# Patient Record
Sex: FEMALE | Race: WHITE | NOT HISPANIC OR LATINO | URBAN - NONMETROPOLITAN AREA
[De-identification: names, ages, dates, MRNs, and addresses within clinical notes are randomized per-mention and may not be internally consistent; named-entity substitution may affect disease eponyms.]

---

## 2022-03-07 ENCOUNTER — LAB (OUTPATIENT)
Dept: LAB | Facility: HOSPITAL | Age: 62
End: 2022-03-07

## 2022-03-07 DIAGNOSIS — N39.0 URINARY TRACT INFECTION WITHOUT HEMATURIA, SITE UNSPECIFIED: ICD-10-CM

## 2022-03-07 DIAGNOSIS — N39.0 URINARY TRACT INFECTION WITHOUT HEMATURIA, SITE UNSPECIFIED: Primary | ICD-10-CM

## 2022-03-07 LAB
BACTERIA UR QL AUTO: ABNORMAL /HPF
BILIRUB UR QL STRIP: NEGATIVE
CLARITY UR: CLEAR
COLOR UR: YELLOW
GLUCOSE UR STRIP-MCNC: NEGATIVE MG/DL
HGB UR QL STRIP.AUTO: ABNORMAL
HYALINE CASTS UR QL AUTO: ABNORMAL /LPF
KETONES UR QL STRIP: NEGATIVE
LEUKOCYTE ESTERASE UR QL STRIP.AUTO: ABNORMAL
NITRITE UR QL STRIP: NEGATIVE
PH UR STRIP.AUTO: 6.5 [PH] (ref 5–8)
PROT UR QL STRIP: NEGATIVE
RBC # UR STRIP: ABNORMAL /HPF
REF LAB TEST METHOD: ABNORMAL
SP GR UR STRIP: 1.01 (ref 1–1.03)
SQUAMOUS #/AREA URNS HPF: ABNORMAL /HPF
UROBILINOGEN UR QL STRIP: ABNORMAL
WBC # UR STRIP: ABNORMAL /HPF

## 2022-03-07 PROCEDURE — 87086 URINE CULTURE/COLONY COUNT: CPT

## 2022-03-07 PROCEDURE — 81001 URINALYSIS AUTO W/SCOPE: CPT

## 2022-03-08 LAB — BACTERIA SPEC AEROBE CULT: NO GROWTH

## 2022-03-10 RX ORDER — NITROFURANTOIN 25; 75 MG/1; MG/1
100 CAPSULE ORAL 2 TIMES DAILY
Qty: 28 CAPSULE | Refills: 0 | Status: SHIPPED | OUTPATIENT
Start: 2022-03-10 | End: 2022-07-26

## 2022-03-10 RX ORDER — PHENAZOPYRIDINE HYDROCHLORIDE 100 MG/1
100 TABLET, FILM COATED ORAL 3 TIMES DAILY PRN
Qty: 300 TABLET | Refills: 0 | Status: SHIPPED | OUTPATIENT
Start: 2022-03-10 | End: 2022-07-26

## 2022-06-01 DIAGNOSIS — Z00.00 ENCOUNTER FOR MEDICAL EXAMINATION TO ESTABLISH CARE: Primary | ICD-10-CM

## 2022-07-26 ENCOUNTER — OFFICE VISIT (OUTPATIENT)
Dept: FAMILY MEDICINE CLINIC | Facility: CLINIC | Age: 62
End: 2022-07-26

## 2022-07-26 VITALS
DIASTOLIC BLOOD PRESSURE: 62 MMHG | OXYGEN SATURATION: 98 % | SYSTOLIC BLOOD PRESSURE: 118 MMHG | WEIGHT: 138.6 LBS | HEART RATE: 67 BPM | HEIGHT: 67 IN | BODY MASS INDEX: 21.75 KG/M2 | RESPIRATION RATE: 18 BRPM

## 2022-07-26 DIAGNOSIS — M54.50 CHRONIC LOW BACK PAIN, UNSPECIFIED BACK PAIN LATERALITY, UNSPECIFIED WHETHER SCIATICA PRESENT: ICD-10-CM

## 2022-07-26 DIAGNOSIS — G89.29 CHRONIC LOW BACK PAIN, UNSPECIFIED BACK PAIN LATERALITY, UNSPECIFIED WHETHER SCIATICA PRESENT: ICD-10-CM

## 2022-07-26 DIAGNOSIS — Z00.00 ANNUAL PHYSICAL EXAM: Primary | ICD-10-CM

## 2022-07-26 DIAGNOSIS — Z76.89 ENCOUNTER TO ESTABLISH CARE: ICD-10-CM

## 2022-07-26 DIAGNOSIS — M19.91 PRIMARY OSTEOARTHRITIS, UNSPECIFIED SITE: ICD-10-CM

## 2022-07-26 PROCEDURE — 99203 OFFICE O/P NEW LOW 30 MIN: CPT | Performed by: FAMILY MEDICINE

## 2022-07-26 RX ORDER — MELOXICAM 15 MG/1
15 TABLET ORAL DAILY
Qty: 90 TABLET | Refills: 3 | Status: SHIPPED | OUTPATIENT
Start: 2022-07-26

## 2022-07-26 RX ORDER — MELOXICAM 15 MG/1
15 TABLET ORAL DAILY
COMMUNITY
End: 2022-07-26 | Stop reason: SDUPTHER

## 2022-07-26 NOTE — PROGRESS NOTES
Chief Complaint  Establish Care    Subjective    History of Present Illness {CC  Problem List  Visit  Diagnosis   Encounters  Notes  Medications  Labs  Result Review Imaging  Media :23}     Elaine Alonzo presents to [facility] [place] for     Chief Complaint   Patient presents with   • Establish Care      Patient seen today to establish care.  Has chronic medical problems including chronic low back pain, intermittent bilateral shoulder pain and arthritis of the hands bilaterally.  Patient has meloxicam and muscle relaxers to use as needed, uses infrequently.  Occasionally requires steroid injection into the shoulder joints.  Can often relieve symptoms of back and shoulder pain with stretches via physical therapy.  She is generally very healthy.  Follows a healthy diet.  Used to swim for exercise, however has switched to riding bike due to shoulder discomfort.  Getting back into regular exercise routine.    Past Medical History:   Diagnosis Date   • Osteoarthritis      Past Surgical History:   Procedure Laterality Date   • CARPAL TUNNEL RELEASE Bilateral    • SHOULDER MANIPULATION Right      Family History   Problem Relation Age of Onset   • Hypertension Mother    • Parkinsonism Mother    • Cancer Sister    • Hypertension Sister    • Hypertension Brother    • Hypertension Maternal Grandmother      Social History     Socioeconomic History   • Marital status:    Tobacco Use   • Smoking status: Former Smoker     Packs/day: 2.00     Years: 7.00     Pack years: 14.00     Quit date:      Years since quittin.5   • Smokeless tobacco: Never Used   Vaping Use   • Vaping Use: Never used   Substance and Sexual Activity   • Alcohol use: Yes   • Drug use: Never   • Sexual activity: Defer       Current Outpatient Medications:   •  meloxicam (MOBIC) 15 MG tablet, Take 1 tablet by mouth Daily., Disp: 90 tablet, Rfl: 3     Allergies   Allergen Reactions   •  "Penicillins Unknown - High Severity     Objective       Vital Signs:   /62   Pulse 67   Resp 18   Ht 168.9 cm (66.5\")   Wt 62.9 kg (138 lb 9.6 oz)   SpO2 98%   BMI 22.04 kg/m²     Physical Exam  Vitals reviewed.   Constitutional:       General: She is not in acute distress.     Appearance: She is well-developed.   HENT:      Right Ear: Ear canal normal.      Left Ear: Tympanic membrane and ear canal normal.      Mouth/Throat:      Mouth: Mucous membranes are moist.      Pharynx: Oropharynx is clear.   Eyes:      Conjunctiva/sclera: Conjunctivae normal.      Pupils: Pupils are equal, round, and reactive to light.   Cardiovascular:      Rate and Rhythm: Normal rate and regular rhythm.      Heart sounds: Normal heart sounds. No murmur heard.  Pulmonary:      Effort: Pulmonary effort is normal. No respiratory distress.      Breath sounds: Normal breath sounds. No wheezing or rales.   Abdominal:      Palpations: Abdomen is soft.      Tenderness: There is no abdominal tenderness.   Musculoskeletal:      Cervical back: Neck supple.   Skin:     General: Skin is warm and dry.      Findings: No lesion (on back) or rash.   Neurological:      Mental Status: She is alert and oriented to person, place, and time.        Result Review :{ Labs  Result Review  Imaging  Med Tab  Media :23}   The following data was reviewed by: Nanci Stephen MD on 07/26/2022    No available results to review in EHR.  Reviewed previous office notes from [facility] pain management regarding back pain and related procedures, MRI lumbar spine and procedure note from [facility].             Assessment and Plan {CC Problem List  Visit Diagnosis  ROS  Review (Popup)  [facility] Maintenance  Quality  BestPractice  Medications  SmartSets  SnapShot Encounters  Media :23}   Diagnoses and all orders for this visit:    1. Annual physical exam (Primary)  -     Lipid Panel; Future  -     CBC & Differential; Future  -     " Comprehensive Metabolic Panel; Future  -     Vitamin D 25 Hydroxy; Future  -     Vitamin B12; Future  -     TSH; Future    2. Chronic low back pain, unspecified back pain laterality, unspecified whether sciatica present  -     meloxicam (MOBIC) 15 MG tablet; Take 1 tablet by mouth Daily.  Dispense: 90 tablet; Refill: 3    3. Primary osteoarthritis, unspecified site  -     meloxicam (MOBIC) 15 MG tablet; Take 1 tablet by mouth Daily.  Dispense: 90 tablet; Refill: 3    4. Encounter to establish care       Annual exam completed today  Patient will return fasting for labs as above  Continue Mobic as needed for chronic low back pain and aches/pains related to osteoarthritis  Patient aware of recommended screenings and immunizations, she declines preventative health measures at this time  Will continue to discuss at annual office visits, however patient will call in the interim should she decide differently  Patient has occasional UTI with very classic symptoms  Advised that patient can call or send [employer] message for antibiotic treatment as needed with UTI symptoms  If frequent or recurrent UTI that does not resolve with antibiotics, would recommend urine samples be collected instead of empiric treatment based on symptoms      Follow Up {Instructions Charge Capture  Follow-up Communications :23}   Return in about 1 year (around 7/26/2023) for Annual physical.  Patient was given instructions and counseling regarding her condition or for health maintenance advice. Please see specific information pulled into the AVS if appropriate.            This document has been electronically signed by Nanci Stephen MD

## 2022-07-28 ENCOUNTER — LAB (OUTPATIENT)
Dept: LAB | Facility: HOSPITAL | Age: 62
End: 2022-07-28

## 2022-07-28 DIAGNOSIS — Z00.00 ANNUAL PHYSICAL EXAM: ICD-10-CM

## 2022-07-28 LAB
25(OH)D3 SERPL-MCNC: 49.9 NG/ML (ref 30–100)
ALBUMIN SERPL-MCNC: 4.7 G/DL (ref 3.5–5.2)
ALBUMIN/GLOB SERPL: 2.2 G/DL
ALP SERPL-CCNC: 85 U/L (ref 39–117)
ALT SERPL W P-5'-P-CCNC: 18 U/L (ref 1–33)
ANION GAP SERPL CALCULATED.3IONS-SCNC: 10.3 MMOL/L (ref 5–15)
AST SERPL-CCNC: 23 U/L (ref 1–32)
BASOPHILS # BLD AUTO: 0.02 10*3/MM3 (ref 0–0.2)
BASOPHILS NFR BLD AUTO: 0.5 % (ref 0–1.5)
BILIRUB SERPL-MCNC: 0.6 MG/DL (ref 0–1.2)
BUN SERPL-MCNC: 21 MG/DL (ref 8–23)
BUN/CREAT SERPL: 28.8 (ref 7–25)
CALCIUM SPEC-SCNC: 9.6 MG/DL (ref 8.6–10.5)
CHLORIDE SERPL-SCNC: 104 MMOL/L (ref 98–107)
CHOLEST SERPL-MCNC: 223 MG/DL (ref 0–200)
CO2 SERPL-SCNC: 24.7 MMOL/L (ref 22–29)
CREAT SERPL-MCNC: 0.73 MG/DL (ref 0.57–1)
DEPRECATED RDW RBC AUTO: 39.3 FL (ref 37–54)
EGFRCR SERPLBLD CKD-EPI 2021: 93.7 ML/MIN/1.73
EOSINOPHIL # BLD AUTO: 0.07 10*3/MM3 (ref 0–0.4)
EOSINOPHIL NFR BLD AUTO: 1.8 % (ref 0.3–6.2)
ERYTHROCYTE [DISTWIDTH] IN BLOOD BY AUTOMATED COUNT: 12.2 % (ref 12.3–15.4)
GLOBULIN UR ELPH-MCNC: 2.1 GM/DL
GLUCOSE SERPL-MCNC: 84 MG/DL (ref 65–99)
HCT VFR BLD AUTO: 41.8 % (ref 34–46.6)
HDLC SERPL-MCNC: 79 MG/DL (ref 40–60)
HGB BLD-MCNC: 14.5 G/DL (ref 12–15.9)
IMM GRANULOCYTES # BLD AUTO: 0.01 10*3/MM3 (ref 0–0.05)
IMM GRANULOCYTES NFR BLD AUTO: 0.3 % (ref 0–0.5)
LDLC SERPL CALC-MCNC: 132 MG/DL (ref 0–100)
LDLC/HDLC SERPL: 1.64 {RATIO}
LYMPHOCYTES # BLD AUTO: 1.43 10*3/MM3 (ref 0.7–3.1)
LYMPHOCYTES NFR BLD AUTO: 35.9 % (ref 19.6–45.3)
MCH RBC QN AUTO: 31.5 PG (ref 26.6–33)
MCHC RBC AUTO-ENTMCNC: 34.7 G/DL (ref 31.5–35.7)
MCV RBC AUTO: 90.7 FL (ref 79–97)
MONOCYTES # BLD AUTO: 0.3 10*3/MM3 (ref 0.1–0.9)
MONOCYTES NFR BLD AUTO: 7.5 % (ref 5–12)
NEUTROPHILS NFR BLD AUTO: 2.15 10*3/MM3 (ref 1.7–7)
NEUTROPHILS NFR BLD AUTO: 54 % (ref 42.7–76)
NRBC BLD AUTO-RTO: 0 /100 WBC (ref 0–0.2)
PLATELET # BLD AUTO: 170 10*3/MM3 (ref 140–450)
PMV BLD AUTO: 11 FL (ref 6–12)
POTASSIUM SERPL-SCNC: 4.7 MMOL/L (ref 3.5–5.2)
PROT SERPL-MCNC: 6.8 G/DL (ref 6–8.5)
RBC # BLD AUTO: 4.61 10*6/MM3 (ref 3.77–5.28)
SODIUM SERPL-SCNC: 139 MMOL/L (ref 136–145)
TRIGL SERPL-MCNC: 71 MG/DL (ref 0–150)
TSH SERPL DL<=0.05 MIU/L-ACNC: 1.8 UIU/ML (ref 0.27–4.2)
VIT B12 BLD-MCNC: 355 PG/ML (ref 211–946)
VLDLC SERPL-MCNC: 12 MG/DL (ref 5–40)
WBC NRBC COR # BLD: 3.98 10*3/MM3 (ref 3.4–10.8)

## 2022-07-28 PROCEDURE — 82607 VITAMIN B-12: CPT

## 2022-07-28 PROCEDURE — 36415 COLL VENOUS BLD VENIPUNCTURE: CPT

## 2022-07-28 PROCEDURE — 82306 VITAMIN D 25 HYDROXY: CPT

## 2022-07-28 PROCEDURE — 80050 GENERAL HEALTH PANEL: CPT

## 2022-07-28 PROCEDURE — 80061 LIPID PANEL: CPT

## 2022-08-03 RX ORDER — LANOLIN ALCOHOL/MO/W.PET/CERES
1000 CREAM (GRAM) TOPICAL DAILY
Qty: 30 TABLET | Refills: 5 | Status: SHIPPED | OUTPATIENT
Start: 2022-08-03

## 2022-08-05 ENCOUNTER — TELEPHONE (OUTPATIENT)
Dept: FAMILY MEDICINE CLINIC | Facility: CLINIC | Age: 62
End: 2022-08-05

## 2022-08-05 NOTE — TELEPHONE ENCOUNTER
-Per Dr. Stephen, Ms. Alonzo has been called with recent lab results & recommendations.  Continue current medications and follow-up as planned or sooner if any problems.       ---- Message from Nanci Stephen MD sent at 8/3/2022  1:02 PM CDT -----  Vitamin D and TSH ok.  Vitamin B12 low normal, would recommend daily supplement - sent.  LDL cholesterol is a little high but so is HDL (good) cholesterol.  Continue with healthy diet and exercise.  CBC and CMP ok.  Thanks, BONNIE Stephen

## 2022-08-05 NOTE — PROGRESS NOTES
Per Dr. Stephen, Ms. Alonzo has been called with recent lab results & recommendations.  Continue current medications and follow-up as planned or sooner if any problems.

## 2022-08-25 ENCOUNTER — PROCEDURE VISIT (OUTPATIENT)
Dept: FAMILY MEDICINE CLINIC | Facility: CLINIC | Age: 62
End: 2022-08-25

## 2022-08-25 VITALS
TEMPERATURE: 97.5 F | OXYGEN SATURATION: 98 % | DIASTOLIC BLOOD PRESSURE: 84 MMHG | WEIGHT: 136.2 LBS | HEART RATE: 69 BPM | HEIGHT: 67 IN | SYSTOLIC BLOOD PRESSURE: 120 MMHG | BODY MASS INDEX: 21.38 KG/M2

## 2022-08-25 DIAGNOSIS — B07.0 PLANTAR WART: Primary | ICD-10-CM

## 2022-08-25 PROCEDURE — 17110 DESTRUCTION B9 LES UP TO 14: CPT | Performed by: STUDENT IN AN ORGANIZED HEALTH CARE EDUCATION/TRAINING PROGRAM

## 2022-08-25 NOTE — PROGRESS NOTES
I have spoken with the patient and was present in the room throughout the procedure.      I have reviewed the notes, assessments, and/or procedures performed by Dr. Guillermina Thornton,   I concur with   her  documentation and assessment and plan for Elaine Alonzo.          This document has been electronically signed by Henrry Bishop MD on August 25, 2022 16:25 CDT

## 2022-08-25 NOTE — PROGRESS NOTES
Family Medicine Residency  Guillermina Thornton MD    Subjective:     Elaine Alonzo is a 62 y.o. female who presents for removal of her plantar wart on her left foot.  She states she noticed it a few days ago.  She states that the plantar wart has been giving her some discomfort.  Otherwise patient has no significant complaints at this time.    The following portions of the patient's history were reviewed and updated as appropriate: allergies, current medications, past family history, past medical history, past social history, past surgical history and problem list.    Past Medical Hx:  Past Medical History:   Diagnosis Date   • Osteoarthritis        Past Surgical Hx:  Past Surgical History:   Procedure Laterality Date   • CARPAL TUNNEL RELEASE Bilateral    • SHOULDER MANIPULATION Right    • URINARY SURGERY  2011    [facility] Implantation       Current Meds:    Current Outpatient Medications:   •  meloxicam (MOBIC) 15 MG tablet, Take 1 tablet by mouth Daily., Disp: 90 tablet, Rfl: 3  •  vitamin B-12 (CYANOCOBALAMIN) 1000 MCG tablet, Take 1 tablet by mouth Daily., Disp: 30 tablet, Rfl: 5    Allergies:  Allergies   Allergen Reactions   • Penicillins Unknown - High Severity       Family Hx:  Family History   Problem Relation Age of Onset   • Hypertension Mother    • Parkinsonism Mother    • Cancer Sister    • Hypertension Sister    • Hypertension Brother    • Hypertension Maternal Grandmother         Social History:  Social History     Socioeconomic History   • Marital status:    Tobacco Use   • Smoking status: Former Smoker     Packs/day: 2.00     Years: 7.00     Pack years: 14.00     Quit date:      Years since quittin.6   • Smokeless tobacco: Never Used   Vaping Use   • Vaping Use: Never used   Substance and Sexual Activity   • Alcohol use: Yes   • Drug use: Never   • Sexual activity: Defer       Review of Systems  Review of Systems   Constitutional: Negative for fatigue.   Respiratory:  "Negative for shortness of breath.    Cardiovascular: Negative for chest pain and leg swelling.   Gastrointestinal: Negative for abdominal pain, constipation, diarrhea and nausea.   Endocrine: Negative for cold intolerance.   Genitourinary: Negative for menstrual problem.   Skin: Negative for rash.   Neurological: Negative for weakness, light-headedness and numbness.       Objective:     /84   Pulse 69   Temp 97.5 °F (36.4 °C)   Ht 168.9 cm (66.5\")   Wt 61.8 kg (136 lb 3.2 oz)   SpO2 98%   BMI 21.65 kg/m²   Physical Exam  Vitals reviewed.   Constitutional:       Appearance: Normal appearance.   HENT:      Head: Normocephalic and atraumatic.      Right Ear: Tympanic membrane normal.      Left Ear: Tympanic membrane normal.      Nose: Nose normal.      Mouth/Throat:      Mouth: Mucous membranes are moist.   Eyes:      Pupils: Pupils are equal, round, and reactive to light.   Cardiovascular:      Rate and Rhythm: Normal rate.      Pulses: Normal pulses.      Heart sounds: Normal heart sounds.   Pulmonary:      Effort: Pulmonary effort is normal.      Breath sounds: Normal breath sounds.   Abdominal:      General: Abdomen is flat. Bowel sounds are normal.      Palpations: Abdomen is soft.   Musculoskeletal:         General: Normal range of motion.      Cervical back: Normal range of motion and neck supple.   Skin:     General: Skin is warm and dry.      Capillary Refill: Capillary refill takes less than 2 seconds.   Neurological:      General: No focal deficit present.      Mental Status: She is alert and oriented to person, place, and time. Mental status is at baseline.   Psychiatric:         Mood and Affect: Mood normal.          Assessment/Plan:     Diagnoses and all orders for this visit:    1. Plantar wart (Primary)  Patient had 1 wart on her plantar surface of her left foot.  Wart was debrided with a 10 blade.  We encouraged the patient to continue to use a pumice stone to remove skin in that area.  " Patient did not want to do cryotherapy at this time.  We will consider doing cryotherapy in the future if plantar wart is not resolved.  Patient was informed to come back to the clinic if patient continues to have pain or discomfort from the plantar wart.      · Rx changes: n/a  · Patient Education: n/a  · Compliance at present is estimated to be good.   · Efforts to improve compliance (if necessary) will be directed at increased exercise.    Depression screening: Up to date; last screen      Follow-up:     Return if symptoms worsen or fail to improve.    Preventative:  Health Maintenance   Topic Date Due   • COLORECTAL CANCER SCREENING  Never done   • COVID-19 Vaccine (1) Never done   • ANNUAL PHYSICAL  Never done   • TDAP/TD VACCINES (1 - Tdap) Never done   • ZOSTER VACCINE (1 of 2) Never done   • PAP SMEAR  Never done   • MAMMOGRAM  07/26/2023 (Originally 1960)   • INFLUENZA VACCINE  10/01/2022   • HEPATITIS C SCREENING  Completed   • Pneumococcal Vaccine 0-64  Aged Out         Alcohol use:  reports current alcohol use.  Nicotine status  reports that she quit smoking about 42 years ago. She has a 14.00 pack-year smoking history. She has never used smokeless tobacco.     Goals    None         RISK SCORE: 3        This document has been electronically signed by Guillermina Thornton MD on August 25, 2022 15:03 CDT

## 2022-10-24 ENCOUNTER — OFFICE VISIT (OUTPATIENT)
Dept: FAMILY MEDICINE CLINIC | Facility: CLINIC | Age: 62
End: 2022-10-24

## 2022-10-24 VITALS
DIASTOLIC BLOOD PRESSURE: 82 MMHG | SYSTOLIC BLOOD PRESSURE: 140 MMHG | OXYGEN SATURATION: 98 % | BODY MASS INDEX: 21.66 KG/M2 | WEIGHT: 138 LBS | HEART RATE: 68 BPM | HEIGHT: 67 IN

## 2022-10-24 DIAGNOSIS — B07.0 PLANTAR WART: Primary | ICD-10-CM

## 2022-10-24 PROCEDURE — 99212 OFFICE O/P EST SF 10 MIN: CPT | Performed by: STUDENT IN AN ORGANIZED HEALTH CARE EDUCATION/TRAINING PROGRAM

## 2022-10-24 PROCEDURE — 17110 DESTRUCTION B9 LES UP TO 14: CPT | Performed by: STUDENT IN AN ORGANIZED HEALTH CARE EDUCATION/TRAINING PROGRAM

## 2022-10-24 NOTE — PROGRESS NOTES
Family Medicine Residency  Guillermina Thornton MD    Subjective:     Elaine Alonzo is a 62 y.o. female who presents for removal of her plantar wart on her left foot.  She states she noticed it a few days ago.  She states that the plantar wart has been giving her some discomfort.  Otherwise patient has no significant complaints at this time.    The following portions of the patient's history were reviewed and updated as appropriate: allergies, current medications, past family history, past medical history, past social history, past surgical history and problem list.    Past Medical Hx:  Past Medical History:   Diagnosis Date   • Osteoarthritis        Past Surgical Hx:  Past Surgical History:   Procedure Laterality Date   • CARPAL TUNNEL RELEASE Bilateral    • SHOULDER MANIPULATION Right    • URINARY SURGERY  2011    [facility] Implantation       Current Meds:    Current Outpatient Medications:   •  vitamin B-12 (CYANOCOBALAMIN) 1000 MCG tablet, Take 1 tablet by mouth Daily., Disp: 30 tablet, Rfl: 5  •  meloxicam (MOBIC) 15 MG tablet, Take 1 tablet by mouth Daily., Disp: 90 tablet, Rfl: 3    Allergies:  Allergies   Allergen Reactions   • Penicillins Unknown - High Severity       Family Hx:  Family History   Problem Relation Age of Onset   • Hypertension Mother    • Parkinsonism Mother    • Cancer Sister    • Hypertension Sister    • Hypertension Brother    • Hypertension Maternal Grandmother         Social History:  Social History     Socioeconomic History   • Marital status:    Tobacco Use   • Smoking status: Former     Packs/day: 2.00     Years: 7.00     Pack years: 14.00     Types: Cigarettes     Quit date:      Years since quittin.8   • Smokeless tobacco: Never   Vaping Use   • Vaping Use: Never used   Substance and Sexual Activity   • Alcohol use: Yes   • Drug use: Never   • Sexual activity: Defer       Review of Systems  Review of Systems   Constitutional: Negative for fatigue.  "  Respiratory: Negative for shortness of breath.    Cardiovascular: Negative for chest pain and leg swelling.   Gastrointestinal: Negative for abdominal pain, constipation, diarrhea and nausea.   Endocrine: Negative for cold intolerance.   Genitourinary: Negative for menstrual problem.   Skin: Negative for rash.   Neurological: Negative for weakness, light-headedness and numbness.       Objective:     /82   Pulse 68   Ht 168.9 cm (66.5\")   Wt 62.6 kg (138 lb)   SpO2 98%   BMI 21.94 kg/m²   Physical Exam  Vitals reviewed.   Constitutional:       Appearance: Normal appearance.   HENT:      Head: Normocephalic and atraumatic.      Right Ear: Tympanic membrane normal.      Left Ear: Tympanic membrane normal.      Nose: Nose normal.      Mouth/Throat:      Mouth: Mucous membranes are moist.   Eyes:      Pupils: Pupils are equal, round, and reactive to light.   Cardiovascular:      Rate and Rhythm: Normal rate.      Pulses: Normal pulses.      Heart sounds: Normal heart sounds.   Pulmonary:      Effort: Pulmonary effort is normal.      Breath sounds: Normal breath sounds.   Abdominal:      General: Abdomen is flat. Bowel sounds are normal.      Palpations: Abdomen is soft.   Musculoskeletal:         General: Normal range of motion.      Cervical back: Normal range of motion and neck supple.   Skin:     General: Skin is warm and dry.      Capillary Refill: Capillary refill takes less than 2 seconds.      Comments: Small plantar wart on her left foot.    Neurological:      General: No focal deficit present.      Mental Status: She is alert and oriented to person, place, and time. Mental status is at baseline.   Psychiatric:         Mood and Affect: Mood normal.          Assessment/Plan:     Diagnoses and all orders for this visit:    1. Plantar wart (Primary)  Patient had 1 wart on her plantar surface of her left foot.  Wart was debrided with a 10 blade.  We encouraged the patient to continue to use a pumice " stone to remove skin in that area.  Patient did not want to do cryotherapy at this time.  We will consider doing cryotherapy in the future if plantar wart is not resolved.  Patient was informed to come back to the clinic if patient continues to have pain or discomfort from the plantar wart.      · Rx changes: n/a  · Patient Education: n/a  · Compliance at present is estimated to be good.   · Efforts to improve compliance (if necessary) will be directed at increased exercise.    Depression screening: Up to date; last screen      Follow-up:     Return if symptoms worsen or fail to improve.    Preventative:  Health Maintenance   Topic Date Due   • COLORECTAL CANCER SCREENING  Never done   • COVID-19 Vaccine (1) Never done   • ANNUAL PHYSICAL  Never done   • TDAP/TD VACCINES (1 - Tdap) Never done   • ZOSTER VACCINE (1 of 2) Never done   • PAP SMEAR  Never done   • INFLUENZA VACCINE  08/01/2022   • MAMMOGRAM  07/26/2023 (Originally 1960)   • HEPATITIS C SCREENING  Completed   • Pneumococcal Vaccine 0-64  Aged Out         Alcohol use:  reports current alcohol use.  Nicotine status  reports that she quit smoking about 42 years ago. Her smoking use included cigarettes. She has a 14.00 pack-year smoking history. She has never used smokeless tobacco.     Goals    None         RISK SCORE: 3        This document has been electronically signed by Guillermina Thornton MD on October 24, 2022 15:19 CDT

## 2022-10-31 NOTE — PROGRESS NOTES
I have seen the patient.  I have reviewed the notes, assessments, and/or procedures performed by Guillermina Thornton MD during office visit I concur with her/his documentation and assessment and plan for Elaine Fofanamarcelino.            This document has been electronically signed by Rachel Sinclair MD on October 31, 2022 08:48 CDT     Clofazimine Pregnancy And Lactation Text: This medication is Pregnancy Category C and isn't considered safe during pregnancy. It is excreted in breast milk.

## 2023-03-06 ENCOUNTER — OFFICE VISIT (OUTPATIENT)
Dept: FAMILY MEDICINE CLINIC | Facility: CLINIC | Age: 63
End: 2023-03-06
Payer: COMMERCIAL

## 2023-03-06 VITALS
OXYGEN SATURATION: 97 % | SYSTOLIC BLOOD PRESSURE: 110 MMHG | HEIGHT: 67 IN | HEART RATE: 60 BPM | BODY MASS INDEX: 21.66 KG/M2 | DIASTOLIC BLOOD PRESSURE: 70 MMHG | WEIGHT: 138 LBS

## 2023-03-06 DIAGNOSIS — B07.0 PLANTAR WART OF LEFT FOOT: Primary | ICD-10-CM

## 2023-03-06 PROCEDURE — 17110 DESTRUCTION B9 LES UP TO 14: CPT | Performed by: STUDENT IN AN ORGANIZED HEALTH CARE EDUCATION/TRAINING PROGRAM

## 2023-03-07 NOTE — PROGRESS NOTES
Family Medicine Residency  Aubrie Lundy MD    Subjective:     Elaine Alonzo is a 62 y.o. female who presents for removal of her plantar wart on her left foot.  She states she noticed it a few days ago and it is bothersome more than painful. She had it removed last time in 2022. Otherwise patient has no significant complaints at this time.    The following portions of the patient's history were reviewed and updated as appropriate: allergies, current medications, past family history, past medical history, past social history, past surgical history and problem list.    Past Medical Hx:  Past Medical History:   Diagnosis Date   • Osteoarthritis        Past Surgical Hx:  Past Surgical History:   Procedure Laterality Date   • CARPAL TUNNEL RELEASE Bilateral    • SHOULDER MANIPULATION Right    • URINARY SURGERY  2011    [facility] Implantation       Current Meds:    Current Outpatient Medications:   •  meloxicam (MOBIC) 15 MG tablet, Take 1 tablet by mouth Daily., Disp: 90 tablet, Rfl: 3  •  vitamin B-12 (CYANOCOBALAMIN) 1000 MCG tablet, Take 1 tablet by mouth Daily., Disp: 30 tablet, Rfl: 5    Allergies:  Allergies   Allergen Reactions   • Penicillins Unknown - High Severity       Family Hx:  Family History   Problem Relation Age of Onset   • Hypertension Mother    • Parkinsonism Mother    • Cancer Sister    • Hypertension Sister    • Hypertension Brother    • Hypertension Maternal Grandmother         Social History:  Social History     Socioeconomic History   • Marital status:    Tobacco Use   • Smoking status: Former     Packs/day: 2.00     Years: 7.00     Pack years: 14.00     Types: Cigarettes     Quit date:      Years since quittin.2   • Smokeless tobacco: Never   Vaping Use   • Vaping Use: Never used   Substance and Sexual Activity   • Alcohol use: Yes   • Drug use: Never   • Sexual activity: Defer       Review of Systems  Review of Systems   Constitutional: Negative for fatigue.  "  Respiratory: Negative for shortness of breath.    Cardiovascular: Negative for chest pain and leg swelling.   Gastrointestinal: Negative for abdominal pain, constipation, diarrhea and nausea.   Endocrine: Negative for cold intolerance.   Genitourinary: Negative for menstrual problem.   Skin: Negative for rash.   Neurological: Negative for weakness, light-headedness and numbness.       Objective:     /70   Pulse 60   Ht 168.9 cm (66.5\")   Wt 62.6 kg (138 lb)   SpO2 97%   BMI 21.94 kg/m²   Physical Exam  Vitals reviewed.   Constitutional:       Appearance: Normal appearance.   HENT:      Head: Normocephalic and atraumatic.      Right Ear: Tympanic membrane normal.      Left Ear: Tympanic membrane normal.      Nose: Nose normal.      Mouth/Throat:      Mouth: Mucous membranes are moist.   Eyes:      Pupils: Pupils are equal, round, and reactive to light.   Cardiovascular:      Rate and Rhythm: Normal rate.      Pulses: Normal pulses.      Heart sounds: Normal heart sounds.   Pulmonary:      Effort: Pulmonary effort is normal.      Breath sounds: Normal breath sounds.   Abdominal:      General: Abdomen is flat. Bowel sounds are normal.      Palpations: Abdomen is soft.   Musculoskeletal:         General: Normal range of motion.      Cervical back: Normal range of motion and neck supple.   Skin:     General: Skin is warm and dry.      Capillary Refill: Capillary refill takes less than 2 seconds.      Comments: Small plantar wart on her left foot.    Neurological:      General: No focal deficit present.      Mental Status: She is alert and oriented to person, place, and time. Mental status is at baseline.   Psychiatric:         Mood and Affect: Mood normal.          Assessment/Plan:     Diagnoses and all orders for this visit:    1. Plantar wart (Primary)    Removed 2 warts on plantar surface of left foot. Wart was removed using a 10 blade. Patient did not want cryotherapy at this time.  We will consider " doing cryotherapy in the future if plantar wart is not resolved.  Patient was informed to come back to the clinic if patient continues to have pain or discomfort from the plantar wart.    Could consider using salicylic acid in the future if warts keep recurring.       She declined all vaccinations including covid/flu/pneumococcal/  Zoster/ and Tdap today.     She declined colonoscopy and understands risks vs benefits.     · Rx changes: n/a  · Patient Education: n/a  · Compliance at present is estimated to be good.   · Efforts to improve compliance (if necessary) will be directed at increased exercise.    Depression screening: Up to date; last screen      Follow-up:     No follow-ups on file.    Preventative:  Health Maintenance   Topic Date Due   • ANNUAL PHYSICAL  Never done   • PAP SMEAR  Never done   • ZOSTER VACCINE (1 of 2) 03/06/2023 (Originally 8/20/2010)   • COVID-19 Vaccine (1) 03/08/2023 (Originally 2/20/1961)   • INFLUENZA VACCINE  03/31/2023 (Originally 8/1/2022)   • MAMMOGRAM  07/26/2023 (Originally 1960)   • COLORECTAL CANCER SCREENING  03/01/2024 (Originally 1960)   • TDAP/TD VACCINES (1 - Tdap) 03/06/2024 (Originally 8/20/1979)   • HEPATITIS C SCREENING  Completed   • Pneumococcal Vaccine 0-64  Aged Out         Alcohol use:  reports current alcohol use.  Nicotine status  reports that she quit smoking about 43 years ago. Her smoking use included cigarettes. She has a 14.00 pack-year smoking history. She has never used smokeless tobacco.     Goals    None         RISK SCORE: 3           Aubrie Lundy M.D. PGY 2  [facility]  [address] [place] [address]  Office: [phone]  This document has been electronically signed by Aubrie Lundy MD on March 6, 2023 20:39 CST

## 2023-03-07 NOTE — PROGRESS NOTES
I have spoken with the patient .  I have reviewed the notes, assessments, and/or procedures performed by Dr. Aubrie Lundy,   I concur with   her  documentation and assessment and plan for Elaine Alonzo.          This document has been electronically signed by Henrry Bishop MD on March 7, 2023 14:39 CST

## 2023-07-28 ENCOUNTER — OFFICE VISIT (OUTPATIENT)
Dept: FAMILY MEDICINE CLINIC | Facility: CLINIC | Age: 63
End: 2023-07-28
Payer: COMMERCIAL

## 2023-07-28 VITALS
SYSTOLIC BLOOD PRESSURE: 136 MMHG | DIASTOLIC BLOOD PRESSURE: 74 MMHG | BODY MASS INDEX: 22.29 KG/M2 | HEIGHT: 67 IN | OXYGEN SATURATION: 97 % | WEIGHT: 142 LBS | HEART RATE: 69 BPM

## 2023-07-28 DIAGNOSIS — Z78.0 POSTMENOPAUSAL: ICD-10-CM

## 2023-07-28 DIAGNOSIS — M79.671 FOOT PAIN, BILATERAL: ICD-10-CM

## 2023-07-28 DIAGNOSIS — M79.672 FOOT PAIN, BILATERAL: ICD-10-CM

## 2023-07-28 DIAGNOSIS — M21.619 BUNION: ICD-10-CM

## 2023-07-28 DIAGNOSIS — Z00.00 ANNUAL PHYSICAL EXAM: Primary | ICD-10-CM

## 2023-07-28 NOTE — PROGRESS NOTES
"Chief Complaint  Annual Exam    Subjective    History of Present Illness {CC  Problem List  Visit  Diagnosis   Encounters  Notes  Medications  Labs  Result Review Imaging  Media :23}     Elaine Alonzo presents to [facility] [place] for     Chief Complaint   Patient presents with    Annual Exam      Elaine Alonzo is a 62 y.o. year old presenting to be seen for her annual exam.      Concerns: Doing gluten free, dairy free diet.     She exercises regularly: yes, stays active  Healthy Diet:yes.  She wears her seat belt:yes.  She has concerns about domestic violence: no.    She is sexually active.    In the past 12 months there has not been new sexual partners.    LMP: postmenopausal      OB History    No obstetric history on file.       She is taking Vit D and Calcium:no  Last colonoscopy or FIT test: declines  Last DEXA: 1/21/2020  Last PAP: declines  Last Mammo: declines    Immunization status: missing doses of COVID, Shingrix - declines.    The following portions of the patient's history were reviewed and updated as appropriate:problem list, current medications, allergies, past medical history, past social history, and past surgical history.     Current Outpatient Medications:     meloxicam (MOBIC) 15 MG tablet, Take 1 tablet by mouth Daily., Disp: 90 tablet, Rfl: 3    vitamin B-12 (CYANOCOBALAMIN) 1000 MCG tablet, Take 1 tablet by mouth Daily., Disp: 30 tablet, Rfl: 5     Objective       Vital Signs:   /74   Pulse 69   Ht 168.9 cm (66.5\")   Wt 64.4 kg (142 lb)   SpO2 97%   BMI 22.58 kg/mý     Physical Exam  Vitals reviewed.   Constitutional:       General: She is not in acute distress.     Appearance: She is well-developed.   HENT:      Right Ear: Tympanic membrane and ear canal normal.      Left Ear: Tympanic membrane and ear canal normal.      Mouth/Throat:      Mouth: Mucous membranes are moist.   Eyes:      Conjunctiva/sclera: " Conjunctivae normal.      Pupils: Pupils are equal, round, and reactive to light.   Cardiovascular:      Rate and Rhythm: Normal rate and regular rhythm.      Heart sounds: Normal heart sounds. No murmur heard.  Pulmonary:      Effort: Pulmonary effort is normal. No respiratory distress.      Breath sounds: Normal breath sounds. No wheezing or rales.   Abdominal:      Palpations: Abdomen is soft.      Tenderness: There is no abdominal tenderness.   Musculoskeletal:      Cervical back: Neck supple.   Skin:     General: Skin is warm and dry.      Findings: No rash.   Neurological:      Mental Status: She is alert and oriented to person, place, and time.   Psychiatric:         Mood and Affect: Mood normal.         Thought Content: Thought content normal.      Result Review :{ Labs  Result Review  Imaging  Med Tab  Media :23}   The following data was reviewed by: Nanci Stephen MD on 07/28/2023      Lab Results   Component Value Date    GLUCOSE 84 07/28/2022    BUN 21 07/28/2022    CREATININE 0.73 07/28/2022    EGFR 93.7 07/28/2022    BCR 28.8 (H) 07/28/2022    K 4.7 07/28/2022    CO2 24.7 07/28/2022    CALCIUM 9.6 07/28/2022    ALBUMIN 4.70 07/28/2022    BILITOT 0.6 07/28/2022    AST 23 07/28/2022    ALT 18 07/28/2022     Lab Results   Component Value Date    WBC 3.98 07/28/2022    HGB 14.5 07/28/2022    HCT 41.8 07/28/2022    MCV 90.7 07/28/2022     07/28/2022     Lab Results   Component Value Date    TSH 1.800 07/28/2022     Lab Results   Component Value Date    CHOL 223 (H) 07/28/2022    CHLPL 237 (H) 01/12/2021    TRIG 71 07/28/2022    HDL 79 (H) 07/28/2022     (H) 07/28/2022              Assessment and Plan {CC Problem List  Visit Diagnosis  ROS  Review (Popup)  Health Maintenance  Quality  BestPractice  Medications  SmartSets  SnapShot Encounters  Media :23}   Diagnoses and all orders for this visit:    1. Annual physical exam (Primary)  -     Lipid Panel; Future  -     CBC &  Differential; Future  -     Comprehensive Metabolic Panel; Future  -     TSH; Future  -     Vitamin B12; Future  -     Vitamin D,25-Hydroxy; Future    2. Postmenopausal  -     DEXA Bone Density Axial; Future       Annual exam completed today  Discussed age appropriate recommendations for screenings and immunizations  Patient does not wish to have any cancer screenings done, understands risks and benefits  Check fasting labs as above - will call with results when available  Encouraged continued healthy diet and regular exercise  Body mass index is 22.58 kg/mý.  BMI is within normal parameters. No other follow-up for BMI required.   DEXA bone scan ordered today  Calcium-vitamin D supplement discussed      Follow Up {Instructions Charge Capture  Follow-up Communications :23}   Return in about 1 year (around 7/28/2024) for Annual physical.  Patient was given instructions and counseling regarding her condition or for health maintenance advice. Please see specific information pulled into the AVS if appropriate.            This document has been electronically signed by Nanci Stephen MD

## 2023-08-16 ENCOUNTER — OFFICE VISIT (OUTPATIENT)
Dept: PODIATRY | Facility: CLINIC | Age: 63
End: 2023-08-16
Payer: COMMERCIAL

## 2023-08-16 VITALS
SYSTOLIC BLOOD PRESSURE: 134 MMHG | WEIGHT: 142 LBS | DIASTOLIC BLOOD PRESSURE: 82 MMHG | HEIGHT: 67 IN | OXYGEN SATURATION: 99 % | HEART RATE: 72 BPM | BODY MASS INDEX: 22.29 KG/M2

## 2023-08-16 DIAGNOSIS — M79.671 BILATERAL FOOT PAIN: Primary | ICD-10-CM

## 2023-08-16 DIAGNOSIS — M77.41 METATARSALGIA OF BOTH FEET: ICD-10-CM

## 2023-08-16 DIAGNOSIS — M79.672 BILATERAL FOOT PAIN: Primary | ICD-10-CM

## 2023-08-16 DIAGNOSIS — M20.11 VALGUS DEFORMITY OF BOTH GREAT TOES: ICD-10-CM

## 2023-08-16 DIAGNOSIS — M20.12 VALGUS DEFORMITY OF BOTH GREAT TOES: ICD-10-CM

## 2023-08-16 DIAGNOSIS — M77.42 METATARSALGIA OF BOTH FEET: ICD-10-CM

## 2023-08-16 NOTE — PROGRESS NOTES
"Elaine Alonzo  1960  62 y.o. female      2023    Chief Complaint   Patient presents with    Left Foot - Pain    Right Foot - Pain       History of Present Illness    Elaine Alonzo is a 62 y.o.female Patient presents to clinic for bilateral foot pain.Patient states pain is in 2nd left toe and in the ball of the right foot.Pain happens in the left toe whenever she walks for long periods of time.Pain in right foot is when she is standing for long periods of time.       Past Medical History:   Diagnosis Date    Osteoarthritis          Past Surgical History:   Procedure Laterality Date    CARPAL TUNNEL RELEASE Bilateral     SHOULDER MANIPULATION Right     URINARY SURGERY  2011    Macroplastique Implantation         Family History   Problem Relation Age of Onset    Hypertension Mother     Parkinsonism Mother     Cancer Sister     Hypertension Sister     Hypertension Brother     Hypertension Maternal Grandmother        Allergies   Allergen Reactions    Penicillins Unknown - High Severity       Social History     Socioeconomic History    Marital status:    Tobacco Use    Smoking status: Former     Packs/day: 2.00     Years: 7.00     Pack years: 14.00     Types: Cigarettes     Quit date:      Years since quittin.6    Smokeless tobacco: Never   Vaping Use    Vaping Use: Never used   Substance and Sexual Activity    Alcohol use: Yes    Drug use: Never    Sexual activity: Defer         Current Outpatient Medications   Medication Sig Dispense Refill    meloxicam (MOBIC) 15 MG tablet Take 1 tablet by mouth Daily. 90 tablet 3    vitamin B-12 (CYANOCOBALAMIN) 1000 MCG tablet Take 1 tablet by mouth Daily. 30 tablet 5     No current facility-administered medications for this visit.       Review of Systems   Musculoskeletal:         Foot pain        OBJECTIVE    /82   Pulse 72   Ht 168.9 cm (66.5\")   Wt 64.4 kg (142 lb)   SpO2 99%   BMI 22.58 kg/mý     Physical Exam  Vitals reviewed. "   Constitutional:       General: She is not in acute distress.     Appearance: She is well-developed.   Cardiovascular:      Pulses:           Dorsalis pedis pulses are 2+ on the right side and 2+ on the left side.        Posterior tibial pulses are 2+ on the right side and 2+ on the left side.   Pulmonary:      Effort: Pulmonary effort is normal.   Musculoskeletal:      Right lower leg: No edema.      Left lower leg: No edema.      Right foot: Decreased range of motion. Bunion and prominent metatarsal heads present.      Left foot: Decreased range of motion. Bunion and prominent metatarsal heads present.   Feet:      Right foot:      Skin integrity: Skin integrity normal.      Left foot:      Skin integrity: Skin integrity normal.   Skin:     General: Skin is warm and dry.      Capillary Refill: Capillary refill takes less than 2 seconds.   Neurological:      Mental Status: She is alert and oriented to person, place, and time.      Gait: Gait is intact. Gait normal.   Psychiatric:         Behavior: Behavior normal. Behavior is cooperative.         Thought Content: Thought content normal. Thought content is not delusional.                Procedures        ASSESSMENT AND PLAN    Diagnoses and all orders for this visit:    1. Bilateral foot pain (Primary)  -     XR foot 3+ vw bilateral; Future    2. Metatarsalgia of both feet    3. Valgus deformity of both great toes        - Patient examined and evaluated  - reviewed imaging with patient. No acute pathology. Moderate HAV deformtiy bilateral  - discussed custom orthotics with metatarsal pad. Rx provided  - recommended stretching  - All questions were answered   - RTC 6 weeks after patient receives custom orthotics             This document has been electronically signed by Aris Ventura DPM on August 16, 2023 18:47 CDT     8/16/2023  18:47 CDT

## 2023-08-23 ENCOUNTER — TRANSCRIBE ORDERS (OUTPATIENT)
Dept: PODIATRY | Facility: CLINIC | Age: 63
End: 2023-08-23
Payer: COMMERCIAL

## 2023-08-23 DIAGNOSIS — M77.40 METATARSALGIA, UNSPECIFIED LATERALITY: Primary | ICD-10-CM

## 2023-09-05 ENCOUNTER — TELEPHONE (OUTPATIENT)
Dept: FAMILY MEDICINE CLINIC | Facility: CLINIC | Age: 63
End: 2023-09-05
Payer: COMMERCIAL

## 2023-09-05 NOTE — TELEPHONE ENCOUNTER
-Per Dr. Stephen, Ms. Alonzo  has been called with recent DEXA Bone Density Scan results & recommendations.  Continue current medications and follow-up as planned or sooner if any problems.     ---- Message from Nanci Stephen MD sent at 9/5/2023  2:23 PM CDT -----  Please call and let patient know that DEXA bone scan showed osteopenia - weakening of the bones.  Would recommend healthy diet, adequate calcium/vitamin D and regular weight bearing exercise like walking for bone health.      I calculated her FRAX score (different from radiology) and determined to be risk of major osteoporotic risk 8.8% and risk for hip fracture 1.0%.      Therefore, not indicated for any pharmacologic treatment at this time.  Will plan to repeat in 2 years.    Thanks, BONNIE Stephen

## 2023-09-07 ENCOUNTER — HOSPITAL ENCOUNTER (OUTPATIENT)
Dept: PHYSICAL THERAPY | Facility: HOSPITAL | Age: 63
Setting detail: THERAPIES SERIES
Discharge: HOME | End: 2023-09-07
Payer: COMMERCIAL

## 2023-09-07 DIAGNOSIS — M77.40 METATARSALGIA, UNSPECIFIED LATERALITY: Primary | ICD-10-CM

## 2023-09-07 PROCEDURE — 97162 PT EVAL MOD COMPLEX 30 MIN: CPT | Performed by: PHYSICAL THERAPIST

## 2023-09-08 NOTE — THERAPY EVALUATION
Outpatient Physical Therapy Ortho Initial Evaluation  [facility]     Patient Name: Elaine Alonzo  : 1960  MRN: [id number]  Today's Date: 2023      Visit Date: 2023    There is no problem list on file for this patient.       Past Medical History:   Diagnosis Date    Osteoarthritis         Past Surgical History:   Procedure Laterality Date    CARPAL TUNNEL RELEASE Bilateral     SHOULDER MANIPULATION Right     URINARY SURGERY  2011    Macroplastique Implantation       Visit Dx:     ICD-10-CM ICD-9-CM   1. Metatarsalgia, unspecified laterality  M77.40 726.70          Patient History       Row Name 23 0855             History    Brief Description of Current Complaint Present today with history of orthotics 8 years ago.Reports recently started having pain in toes (left 2nd toe), standing and walking increases pain. Pain in ball of foot is noted. Denies neuropathy.  -BB      Patient/Caregiver Goals Relieve pain  -BB      Patient seeing anyone else for problem(s)? Volunteer at [facility]  -BB                User Key  (r) = Recorded By, (t) = Taken By, (c) = Cosigned By      Initials Name Provider Type    Cortney Braga, PT DPT Physical Therapist                     PT Ortho       Row Name 23 0855       Posture/Observations    Posture/Observations Comments pes cavus arch type, increased met head drop bilaterally with pain and 2nd/3rd met head. Skin in tact. Sensation in tact to crude touch.  -BB              User Key  (r) = Recorded By, (t) = Taken By, (c) = Cosigned By      Initials Name Provider Type    Cortney Braga, PT DPT Physical Therapist                                       PT OP Goals       Row Name 23 0855          PT Short Term Goals    STG Date to Achieve 23  -BB     STG 1 independent in orthotic wear schedule and skin inspection  -BB        Time Calculation    PT Goal Re-Cert Due Date 23  -BB               User Key  (r) = Recorded By, (t)  = Taken By, (c) = Cosigned By      Initials Name Provider Type    Cortney Braga PT DPT Physical Therapist                     PT Assessment/Plan       Row Name 09/07/23 0855          PT Assessment    Functional Limitations Impaired gait;Limitations in functional capacity and performance  -BB     Impairments Gait;Poor body mechanics  -BB     Assessment Comments Presents today with reports of symptoms of the digits with history of orthotics in past of many years ago and needing new insoles for support and met head relief. Orthotics to be fabricated per DPM recommendations.  -BB     Rehab Potential Good  -BB     Patient/caregiver participated in establishment of treatment plan and goals Yes  -BB     Patient would benefit from skilled therapy intervention Yes  -BB        PT Plan    Predicted Duration of Therapy Intervention (PT) PRN  -BB     Planned CPT's? PT THER SUPP EA 15 MIN;PT INITIAL ORTHOTIC MGMT/TRAIN EA 15 MIN: 28557;PT EVAL MOD COMPLELITY: 21213  -BB     PT Plan Comments orthotic checkout and adjustment PRN  -BB               User Key  (r) = Recorded By, (t) = Taken By, (c) = Cosigned By      Initials Name Provider Type    Cortney Braga PT DPT Physical Therapist                       OP Exercises       Row Name 09/07/23 0810             Subjective Comments    Subjective Comments see pt hx  -BB         Subjective Pain    Able to rate subjective pain? yes  -BB      Pre-Treatment Pain Level 0  -BB      Post-Treatment Pain Level 0  -BB         Exercise 1    Exercise Name 1 eval/orthotic scan  -BB                User Key  (r) = Recorded By, (t) = Taken By, (c) = Cosigned By      Initials Name Provider Type    Cortney Braga PT DPT Physical Therapist                                            Time Calculation:     Start Time: 0855  Stop Time: 0915  Time Calculation (min): 20 min     Therapy Charges for Today       Code Description Service Date Service Provider Modifiers Qty    [id number]  PT-CUSTOM  ORTHOTICS-LEVEL 1 9/7/2023 Cortney Vazquez, PT DPT  1    [id number] HC PT EVAL MOD COMPLEXITY 1 9/7/2023 Cortney Vazquez, PT DPT GP 1                      Cortney Vazquez, PT DPT  9/8/2023